# Patient Record
Sex: FEMALE | Race: WHITE | NOT HISPANIC OR LATINO | Employment: FULL TIME | URBAN - METROPOLITAN AREA
[De-identification: names, ages, dates, MRNs, and addresses within clinical notes are randomized per-mention and may not be internally consistent; named-entity substitution may affect disease eponyms.]

---

## 2023-03-11 ENCOUNTER — APPOINTMENT (OUTPATIENT)
Dept: ULTRASOUND IMAGING | Facility: CLINIC | Age: 33
End: 2023-03-11
Attending: EMERGENCY MEDICINE
Payer: COMMERCIAL

## 2023-03-11 ENCOUNTER — HOSPITAL ENCOUNTER (EMERGENCY)
Facility: CLINIC | Age: 33
Discharge: HOME | End: 2023-03-11
Attending: EMERGENCY MEDICINE | Admitting: EMERGENCY MEDICINE
Payer: COMMERCIAL

## 2023-03-11 VITALS
SYSTOLIC BLOOD PRESSURE: 119 MMHG | HEART RATE: 87 BPM | RESPIRATION RATE: 18 BRPM | WEIGHT: 158.4 LBS | TEMPERATURE: 97.4 F | OXYGEN SATURATION: 100 % | HEIGHT: 66 IN | DIASTOLIC BLOOD PRESSURE: 77 MMHG | BODY MASS INDEX: 25.46 KG/M2

## 2023-03-11 DIAGNOSIS — R10.2 PELVIC PAIN IN FEMALE: ICD-10-CM

## 2023-03-11 DIAGNOSIS — R74.8 ELEVATED LIPASE: ICD-10-CM

## 2023-03-11 LAB
ALBUMIN SERPL BCG-MCNC: 4.1 G/DL (ref 3.5–5.2)
ALBUMIN UR-MCNC: NEGATIVE MG/DL
ALP SERPL-CCNC: 75 U/L (ref 35–104)
ALT SERPL W P-5'-P-CCNC: 12 U/L (ref 10–35)
ANION GAP SERPL CALCULATED.3IONS-SCNC: 13 MMOL/L (ref 7–15)
APPEARANCE UR: CLEAR
AST SERPL W P-5'-P-CCNC: 14 U/L (ref 10–35)
BACTERIA #/AREA URNS HPF: ABNORMAL /HPF
BASOPHILS # BLD AUTO: 0 10E3/UL (ref 0–0.2)
BASOPHILS NFR BLD AUTO: 0 %
BILIRUB SERPL-MCNC: 0.2 MG/DL
BILIRUB UR QL STRIP: NEGATIVE
BUN SERPL-MCNC: 5.2 MG/DL (ref 6–20)
CALCIUM SERPL-MCNC: 9.1 MG/DL (ref 8.6–10)
CHLORIDE SERPL-SCNC: 102 MMOL/L (ref 98–107)
CLUE CELLS: NORMAL
COLOR UR AUTO: ABNORMAL
CREAT SERPL-MCNC: 0.52 MG/DL (ref 0.51–0.95)
DEPRECATED HCO3 PLAS-SCNC: 20 MMOL/L (ref 22–29)
EOSINOPHIL # BLD AUTO: 0.1 10E3/UL (ref 0–0.7)
EOSINOPHIL NFR BLD AUTO: 1 %
ERYTHROCYTE [DISTWIDTH] IN BLOOD BY AUTOMATED COUNT: 13 % (ref 10–15)
GFR SERPL CREATININE-BSD FRML MDRD: >90 ML/MIN/1.73M2
GLUCOSE SERPL-MCNC: 68 MG/DL (ref 70–99)
GLUCOSE UR STRIP-MCNC: NEGATIVE MG/DL
HCT VFR BLD AUTO: 40.3 % (ref 35–47)
HGB BLD-MCNC: 12.5 G/DL (ref 11.7–15.7)
HGB UR QL STRIP: NEGATIVE
IMM GRANULOCYTES # BLD: 0 10E3/UL
IMM GRANULOCYTES NFR BLD: 0 %
KETONES UR STRIP-MCNC: NEGATIVE MG/DL
LEUKOCYTE ESTERASE UR QL STRIP: NEGATIVE
LIPASE SERPL-CCNC: 235 U/L (ref 13–60)
LYMPHOCYTES # BLD AUTO: 1.7 10E3/UL (ref 0.8–5.3)
LYMPHOCYTES NFR BLD AUTO: 20 %
MCH RBC QN AUTO: 28.6 PG (ref 26.5–33)
MCHC RBC AUTO-ENTMCNC: 31 G/DL (ref 31.5–36.5)
MCV RBC AUTO: 92 FL (ref 78–100)
MONOCYTES # BLD AUTO: 0.5 10E3/UL (ref 0–1.3)
MONOCYTES NFR BLD AUTO: 6 %
NEUTROPHILS # BLD AUTO: 6.2 10E3/UL (ref 1.6–8.3)
NEUTROPHILS NFR BLD AUTO: 73 %
NITRATE UR QL: NEGATIVE
NRBC # BLD AUTO: 0 10E3/UL
NRBC BLD AUTO-RTO: 0 /100
PH UR STRIP: 6.5 [PH] (ref 5–7)
PLATELET # BLD AUTO: 221 10E3/UL (ref 150–450)
POTASSIUM SERPL-SCNC: 4.3 MMOL/L (ref 3.4–5.3)
PROT SERPL-MCNC: 6.7 G/DL (ref 6.4–8.3)
RBC # BLD AUTO: 4.37 10E6/UL (ref 3.8–5.2)
RBC URINE: 0 /HPF
SODIUM SERPL-SCNC: 135 MMOL/L (ref 136–145)
SP GR UR STRIP: 1 (ref 1–1.03)
SQUAMOUS EPITHELIAL: <1 /HPF
TRICHOMONAS, WET PREP: NORMAL
UROBILINOGEN UR STRIP-MCNC: NORMAL MG/DL
WBC # BLD AUTO: 8.5 10E3/UL (ref 4–11)
WBC URINE: 2 /HPF
WBC'S/HIGH POWER FIELD, WET PREP: NORMAL
YEAST, WET PREP: NORMAL

## 2023-03-11 PROCEDURE — 87491 CHLMYD TRACH DNA AMP PROBE: CPT | Performed by: EMERGENCY MEDICINE

## 2023-03-11 PROCEDURE — 87210 SMEAR WET MOUNT SALINE/INK: CPT | Performed by: EMERGENCY MEDICINE

## 2023-03-11 PROCEDURE — 76805 OB US >/= 14 WKS SNGL FETUS: CPT | Mod: 26 | Performed by: RADIOLOGY

## 2023-03-11 PROCEDURE — 36415 COLL VENOUS BLD VENIPUNCTURE: CPT | Performed by: EMERGENCY MEDICINE

## 2023-03-11 PROCEDURE — 85025 COMPLETE CBC W/AUTO DIFF WBC: CPT | Performed by: EMERGENCY MEDICINE

## 2023-03-11 PROCEDURE — 99285 EMERGENCY DEPT VISIT HI MDM: CPT | Performed by: EMERGENCY MEDICINE

## 2023-03-11 PROCEDURE — 76805 OB US >/= 14 WKS SNGL FETUS: CPT

## 2023-03-11 PROCEDURE — 80053 COMPREHEN METABOLIC PANEL: CPT | Performed by: EMERGENCY MEDICINE

## 2023-03-11 PROCEDURE — 87591 N.GONORRHOEAE DNA AMP PROB: CPT | Performed by: EMERGENCY MEDICINE

## 2023-03-11 PROCEDURE — 76705 ECHO EXAM OF ABDOMEN: CPT | Mod: 26 | Performed by: RADIOLOGY

## 2023-03-11 PROCEDURE — 81001 URINALYSIS AUTO W/SCOPE: CPT | Performed by: EMERGENCY MEDICINE

## 2023-03-11 PROCEDURE — 83690 ASSAY OF LIPASE: CPT | Performed by: EMERGENCY MEDICINE

## 2023-03-11 PROCEDURE — 76705 ECHO EXAM OF ABDOMEN: CPT

## 2023-03-11 PROCEDURE — 99285 EMERGENCY DEPT VISIT HI MDM: CPT | Mod: 25 | Performed by: EMERGENCY MEDICINE

## 2023-03-11 RX ORDER — PRENATAL VIT/IRON FUM/FOLIC AC 27MG-0.8MG
1 TABLET ORAL DAILY
COMMUNITY

## 2023-03-11 RX ORDER — HYDROCODONE BITARTRATE AND ACETAMINOPHEN 5; 325 MG/1; MG/1
1 TABLET ORAL EVERY 6 HOURS PRN
Qty: 10 TABLET | Refills: 0 | Status: SHIPPED | OUTPATIENT
Start: 2023-03-11 | End: 2023-03-14

## 2023-03-11 RX ORDER — BUTALBITAL, ACETAMINOPHEN AND CAFFEINE 50; 325; 40 MG/1; MG/1; MG/1
1 TABLET ORAL EVERY 4 HOURS PRN
Qty: 20 TABLET | Refills: 0 | Status: SHIPPED | OUTPATIENT
Start: 2023-03-11

## 2023-03-11 RX ORDER — BUTALBITAL, ACETAMINOPHEN AND CAFFEINE 50; 325; 40 MG/1; MG/1; MG/1
1 TABLET ORAL EVERY 4 HOURS PRN
COMMUNITY
End: 2023-08-05

## 2023-03-11 RX ORDER — BUPROPION HYDROCHLORIDE 150 MG/1
150 TABLET, EXTENDED RELEASE ORAL 2 TIMES DAILY
COMMUNITY
End: 2023-08-05

## 2023-03-11 RX ORDER — CITALOPRAM HYDROBROMIDE 40 MG/1
40 TABLET ORAL DAILY
COMMUNITY

## 2023-03-11 RX ORDER — METHYLPHENIDATE HYDROCHLORIDE 36 MG/1
36 TABLET ORAL EVERY MORNING
COMMUNITY

## 2023-03-11 RX ORDER — METOCLOPRAMIDE 10 MG/1
10 TABLET ORAL 2 TIMES DAILY PRN
COMMUNITY
End: 2023-08-05

## 2023-03-11 RX ORDER — ONDANSETRON 4 MG/1
4 TABLET, FILM COATED ORAL EVERY 8 HOURS PRN
Qty: 15 TABLET | Refills: 0 | Status: SHIPPED | OUTPATIENT
Start: 2023-03-11

## 2023-03-11 RX ORDER — METHYLPHENIDATE HYDROCHLORIDE 10 MG/1
10 TABLET ORAL 2 TIMES DAILY
COMMUNITY

## 2023-03-11 ASSESSMENT — ACTIVITIES OF DAILY LIVING (ADL)
ADLS_ACUITY_SCORE: 35
ADLS_ACUITY_SCORE: 33
ADLS_ACUITY_SCORE: 33

## 2023-03-11 NOTE — DISCHARGE INSTRUCTIONS
TODAY'S VISIT:  You were seen today for pelvic pain   -   - If you had any labs or imaging/radiology tests performed today, you should also discuss these tests with your usual provider.     FOLLOW-UP:  Please make an appointment to follow up with:  - Your Primary Care Provider. If you do not have a PCP, please call the Primary Care Center (phone: [phone] for an appointment  - OB/Gyn - [facility] (phone: [phone])     - Have your provider review the results from today's visit with you again to make sure no further follow-up or additional testing is needed based on those results.     RETURN TO THE EMERGENCY DEPARTMENT  Return to the Emergency Department at any time for any new or worsening symptoms or any concerns.

## 2023-03-11 NOTE — ED PROVIDER NOTES
History     Chief Complaint   Patient presents with     Pelvic Pain     HPI  Paige Wood is a 32 year old  female with a past medical history of ADHD, anxiety who presents to the emergency department with a chief complaint of pelvic pain. She reports she was diagnosed with a UTI on , treated with antibiotics. She reports having flank pain at that time, no kidney stones at that time. She has noticed darker, cloudier urine again about 1 week ago. She held off on coming in and the pain has worsened. She called her OB clinic and was advised to come in. She is currently 19 weeks pregnant. She also notes some lower back pain. Dysuria is a burning sensation, mostly on the left. Gets OB care through [facility] in [place].  No history of UTIs prior to this pregnancy.  The patient notes that the pain was severe enough last night that she was having difficulty sleeping.  She did take a Fioricet (she is prescribed this to take as needed for headaches) which did help her pelvic pain as well.    Pt presents with a week long history of bilateral low back, pelvic and lower abdominal pain.  Pt did not see OB for pain.  Called nurse line this AM and was advised to present to ED for additional evaluation.     Unable to access pt's records through care everywhere (unclear why this does not come up on search. Pt's chart is currently marked for merge, but I was also unable to access care everywhere records through pt's prior name), but on pt's mychart viewed on pt's phone  UA showed bacteria and WBCs, urine culture with no growth. US of the kidneys was normal. WBC count was normal.     I have reviewed the Medications, Allergies, Past Medical and Surgical History, and Social History in the [employer] system.    Past Medical History:   Diagnosis Date     ADHD (attention deficit hyperactivity disorder)      Anxiety      History reviewed. No pertinent surgical history.  No current facility-administered medications for this  "encounter.     Current Outpatient Medications   Medication     buPROPion (WELLBUTRIN SR) 150 MG 12 hr tablet     butalbital-acetaminophen-caffeine (ESGIC) -40 MG tablet     citalopram (CELEXA) 40 MG tablet     methylphenidate (CONCERTA) 36 MG CR tablet     methylphenidate (RITALIN) 10 MG tablet     metoclopramide (REGLAN) 10 MG tablet     Prenatal Vit-Fe Fumarate-FA (PRENATAL MULTIVITAMIN W/IRON) 27-0.8 MG tablet     Allergies   Allergen Reactions     Azithromycin Nausea and Vomiting     Metformin Nausea and Vomiting     Past medical history, past surgical history, medications, and allergies were reviewed with the patient. Additional pertinent items: None    Social History     Socioeconomic History     Marital status:      Spouse name: Not on file     Number of children: Not on file     Years of education: Not on file     Highest education level: Not on file   Occupational History     Not on file   Tobacco Use     Smoking status: Never     Smokeless tobacco: Never   Substance and Sexual Activity     Alcohol use: Not on file     Drug use: Not on file     Sexual activity: Not on file   Other Topics Concern     Not on file   Social History Narrative     Not on file     Social Determinants of Health     Financial Resource Strain: Not on file   Food Insecurity: Not on file   Transportation Needs: Not on file   Physical Activity: Not on file   Stress: Not on file   Social Connections: Not on file   Intimate Partner Violence: Not on file   Housing Stability: Not on file     Social history was reviewed with the patient. Additional pertinent items: None    Review of Systems  A medically appropriate review of systems was performed with pertinent positives and negatives noted in the HPI, and all other systems negative.    Physical Exam   BP: 119/77  Pulse: 87  Temp: 97.4  F (36.3  C)  Resp: 18  Height: 167.6 cm (5' 6\")  Weight: 71.8 kg (158 lb 6.4 oz)  SpO2: 100 %      General: Well nourished, well developed, " NAD  HEENT: EOMI, anicteric. NCAT, MMM  Neck: no jugular venous distension, supple, nl ROM  Cardiac: Regular rate and rhythm. No murmurs, rubs, or gallops. Normal S1, S2.  Intact peripheral pulses  Pulm: CTAB, no stridor, wheezes, rales, rhonchi  Abd: Soft, gravid, TTP BL pelvis area (left greater than right and L lower back, no CVA TTP, no epigastric TTP, there is some mild LUQ TTP, no rebound or guarding, nondistended.  No masses palpated.  : external exam normal, white vaginal discharge is present   Skin: Warm and dry to the touch.  No rash  Extremities: No LE edema, no cyanosis, w/w/p  Neuro: A&Ox3, no gross focal deficits    ED Course        Procedures                        Labs Ordered and Resulted from Time of ED Arrival to Time of ED Departure   COMPREHENSIVE METABOLIC PANEL - Abnormal       Result Value    Sodium 135 (*)     Potassium 4.3      Chloride 102      Carbon Dioxide (CO2) 20 (*)     Anion Gap 13      Urea Nitrogen 5.2 (*)     Creatinine 0.52      Calcium 9.1      Glucose 68 (*)     Alkaline Phosphatase 75      AST 14      ALT 12      Protein Total 6.7      Albumin 4.1      Bilirubin Total 0.2      GFR Estimate >90     LIPASE - Abnormal    Lipase 235 (*)    ROUTINE UA WITH MICROSCOPIC REFLEX TO CULTURE - Abnormal    Color Urine Light Yellow      Appearance Urine Clear      Glucose Urine Negative      Bilirubin Urine Negative      Ketones Urine Negative      Specific Gravity Urine 1.004      Blood Urine Negative      pH Urine 6.5      Protein Albumin Urine Negative      Urobilinogen Urine Normal      Nitrite Urine Negative      Leukocyte Esterase Urine Negative      Bacteria Urine Few (*)     RBC Urine 0      WBC Urine 2      Squamous Epithelials Urine <1     CBC WITH PLATELETS AND DIFFERENTIAL - Abnormal    WBC Count 8.5      RBC Count 4.37      Hemoglobin 12.5      Hematocrit 40.3      MCV 92      MCH 28.6      MCHC 31.0 (*)     RDW 13.0      Platelet Count 221      % Neutrophils 73      %  Lymphocytes 20      % Monocytes 6      % Eosinophils 1      % Basophils 0      % Immature Granulocytes 0      NRBCs per 100 WBC 0      Absolute Neutrophils 6.2      Absolute Lymphocytes 1.7      Absolute Monocytes 0.5      Absolute Eosinophils 0.1      Absolute Basophils 0.0      Absolute Immature Granulocytes 0.0      Absolute NRBCs 0.0     WET PREPARATION - Normal    Trichomonas Absent      Yeast Absent      Clue Cells Absent      WBCs/high power field None     CHLAMYDIA TRACHOMATIS PCR   NEISSERIA GONORRHOEAE PCR            Results for orders placed or performed during the hospital encounter of 03/11/23 (from the past 24 hour(s))   UA with Microscopic reflex to Culture    Specimen: Urine, Midstream   Result Value Ref Range    Color Urine Light Yellow Colorless, Straw, Light Yellow, Yellow    Appearance Urine Clear Clear    Glucose Urine Negative Negative mg/dL    Bilirubin Urine Negative Negative    Ketones Urine Negative Negative mg/dL    Specific Gravity Urine 1.004 1.003 - 1.035    Blood Urine Negative Negative    pH Urine 6.5 5.0 - 7.0    Protein Albumin Urine Negative Negative mg/dL    Urobilinogen Urine Normal Normal, 2.0 mg/dL    Nitrite Urine Negative Negative    Leukocyte Esterase Urine Negative Negative    Bacteria Urine Few (A) None Seen /HPF    RBC Urine 0 <=2 /HPF    WBC Urine 2 <=5 /HPF    Squamous Epithelials Urine <1 <=1 /HPF    Narrative    Urine Culture not indicated   CBC with platelets differential    Narrative    The following orders were created for panel order CBC with platelets differential.  Procedure                               Abnormality         Status                     ---------                               -----------         ------                     CBC with platelets and d...[[id number]]  Abnormal            Final result                 Please view results for these tests on the individual orders.   Comprehensive metabolic panel   Result Value Ref Range    Sodium 135 (L) 136  - 145 mmol/L    Potassium 4.3 3.4 - 5.3 mmol/L    Chloride 102 98 - 107 mmol/L    Carbon Dioxide (CO2) 20 (L) 22 - 29 mmol/L    Anion Gap 13 7 - 15 mmol/L    Urea Nitrogen 5.2 (L) 6.0 - 20.0 mg/dL    Creatinine 0.52 0.51 - 0.95 mg/dL    Calcium 9.1 8.6 - 10.0 mg/dL    Glucose 68 (L) 70 - 99 mg/dL    Alkaline Phosphatase 75 35 - 104 U/L    AST 14 10 - 35 U/L    ALT 12 10 - 35 U/L    Protein Total 6.7 6.4 - 8.3 g/dL    Albumin 4.1 3.5 - 5.2 g/dL    Bilirubin Total 0.2 <=1.2 mg/dL    GFR Estimate >90 >60 mL/min/1.73m2   Lipase   Result Value Ref Range    Lipase 235 (H) 13 - 60 U/L   CBC with platelets and differential   Result Value Ref Range    WBC Count 8.5 4.0 - 11.0 10e3/uL    RBC Count 4.37 3.80 - 5.20 10e6/uL    Hemoglobin 12.5 11.7 - 15.7 g/dL    Hematocrit 40.3 35.0 - 47.0 %    MCV 92 78 - 100 fL    MCH 28.6 26.5 - 33.0 pg    MCHC 31.0 (L) 31.5 - 36.5 g/dL    RDW 13.0 10.0 - 15.0 %    Platelet Count 221 150 - 450 10e3/uL    % Neutrophils 73 %    % Lymphocytes 20 %    % Monocytes 6 %    % Eosinophils 1 %    % Basophils 0 %    % Immature Granulocytes 0 %    NRBCs per 100 WBC 0 <1 /100    Absolute Neutrophils 6.2 1.6 - 8.3 10e3/uL    Absolute Lymphocytes 1.7 0.8 - 5.3 10e3/uL    Absolute Monocytes 0.5 0.0 - 1.3 10e3/uL    Absolute Eosinophils 0.1 0.0 - 0.7 10e3/uL    Absolute Basophils 0.0 0.0 - 0.2 10e3/uL    Absolute Immature Granulocytes 0.0 <=0.4 10e3/uL    Absolute NRBCs 0.0 10e3/uL   US OB > 14 Weeks    Narrative    Obstetrical ultrasound greater than 14 weeks    INDICATION: Pelvic pain    COMPARISON: None    FINDINGS: Still images acquired. An intrauterine pregnancy is  identified. Biparietal diameter is 4.10 cm. Head circumference is 15.6  cm. These correspond with an 18 week 4 day gestation. Femoral length  is measured at 2.8 cm corresponding with an 18 week 3 day gestation.  Fetal cardiac activity is documented at 153 bpm. The fetal lie is  currently cephalic. There is no evidence of retroplacental  hemorrhage  the placenta is anterior. No suspicious fluid collections.      Impression    IMPRESSION: Single, live intrauterine pregnancy measuring  approximately 18 weeks 4 days gestational age by ultrasound  measurements.    ANDRZEJ HANDY MD         SYSTEM ID:  [id number]   Wet prep    Specimen: Vagina; Swab   Result Value Ref Range    Trichomonas Absent Absent    Yeast Absent Absent    Clue Cells Absent Absent    WBCs/high power field None None   Abdomen US, limited (RUQ only)    Narrative    EXAMINATION: US ABDOMEN LIMITED, 3/11/2023 8:29 PM    COMPARISON: None.    HISTORY: elevated lipase, abd pain, elevated lipase, abd pain    TECHNIQUE: The abdomen was scanned in standard fashion with  specialized ultrasound transducer(s) using both grey scale and limited  color Doppler techniques.    FINDINGS:   Fluid: No evidence of ascites or pleural effusions.    Liver: The liver demonstrates normal echotexture, measuring 14.1 cm in  craniocaudal dimension. There is no focal mass. The main portal vein  is patent with antegrade flow towards the liver.    Gallbladder: There is no wall thickening, pericholecystic fluid,  positive sonographic Greenberg's sign or evidence for cholelithiasis.    Bile Ducts: Both the intra- and extrahepatic biliary system are of  normal caliber.  The common bile duct measures 2 mm in diameter.    Pancreas: Visualized portions of the head and body of the pancreas are  unremarkable.     Kidney: The right kidney measures 11.2 cm long. There is no  hydronephrosis or hydroureter, no shadowing renal calculi, cystic  lesion or mass.       Impression    IMPRESSION: Normal right upper quadrant ultrasound.    I have personally reviewed the examination and initial interpretation  and I agree with the findings.    ANDRZEJ HANDY MD         SYSTEM ID:  [id number]       Labs, vital signs, and imaging studies were reviewed by me.    Medications - No data to display    Assessments & Plan (with Medical  Decision Making)   Paige Wood is a 32 year old female who presents to the emergency department with pelvic pain and dysuria.  Differential diagnosis includes UTI, renal stone, pregnancy related pain/round ligament pain, ovarian cyst/torsion, constipation, gastritis/gastroenteritis, peptic ulcer disease, GERD, pancreatitis.  Labs, urinalysis ordered to further evaluate the patient.    Urinalysis is not consistent with UTI.  Will obtain pelvic ultrasound to evaluate for other pathology.    Laboratory work-up is remarkable for normal white blood cell count, creatinine is not elevated, lipase is significantly elevated at 235 (this is greater than 3 times the upper limit of normal which for our lab is 60).  Unclear if patient has having symptoms consistent with pancreatitis, she has had some nausea and does have some mild left upper quadrant tenderness on exam, however, patient's pain is primarily located in the pelvic area, primarily the left pelvis. CT of the abdomen would potentially allow for further evaluate for pancreatic pathology, but was deferred due to current pregnancy.  Will discuss with OB/GYN and GI.    Patient was discussed with OB/GYN, ultrasound and labs are overall reassuring.  No clear obstetric reason for lipase elevation.  They would recommend pelvic exam with wet prep/GC/chlamydia testing.    Wet prep is negative    Patient was discussed with gastroenterology, they would recommend right upper quadrant ultrasound to evaluate for biliary obstruction or other underlying pathology associated with pancreatic enzyme elevation.  If this is negative, patient could be discharged home with outpatient follow-up if able to tolerate p.o. and symptoms well controlled.    Right upper quadrant ultrasound is negative.    Considered admission for IV fluids and pain control, but patient's symptoms currently manageable and patient was p.o. challenged in the emergency department and was able to tolerate p.o. without  difficulty, so plan to discharge patient with close outpatient follow up with her PCP or OBGYN with low threshold for patient to return to the ER if she is unable to tolerate PO or if her pain is not well managed. Pt is in agreement with this plan.     Critical care was not performed.     Medical Decision Making  The patient's presentation was of moderate complexity (an undiagnosed new problem with uncertain diagnosis).    The patient's evaluation involved:  ordering and/or review of 3+ test(s) in this encounter (see separate area of note for details)  strong consideration of a test (see separate area of note for details) that was ultimately deferred  independent interpretation of testing performed by another health professional ([place])  discussion of management or test interpretation with another health professional (see separate area of note for details)    The patient's management necessitated moderate risk (prescription drug management including medications given in the ED) and high risk (a decision regarding hospitalization)    I have reviewed the nursing notes.    I have reviewed the findings, diagnosis, plan and need for follow up with the patient.    Patient to be discharged home. Advised to follow up with PCP within 1 week. To return to ER immediately with any new/worsening symptoms. Plan of care discussed with patient who expresses understanding and agrees with plan of care.    Patient to be provided with prescriptions for symptomatic relief at home, including Zofran and Norco (patient instructed to use this only if pain not controlled with other medications).  Additionally, the patient states she takes Fioricet as needed for her headaches and is running out of this medication, she does request a new prescription for this which was provided.  Risks and benefits of the usage of these medications, particularly as regards patient's current pregnancy (particularly possible increased risk of birth defects - which  would have been greatest in early pregnancy) and risk of opiate dependence were discussed with the patient.    Discharge Medication List as of 3/11/2023  9:19 PM      START taking these medications    Details   !! butalbital-acetaminophen-caffeine (ESGIC) -40 MG tablet Take 1 tablet by mouth every 4 hours as needed for headaches, Disp-20 tablet, R-0, Local Print      ondansetron (ZOFRAN) 4 MG tablet Take 1 tablet (4 mg) by mouth every 8 hours as needed, Disp-15 tablet, R-0, Local Print       !! - Potential duplicate medications found. Please discuss with provider.          Final diagnoses:   Pelvic pain in female   Elevated lipase       Petty Marquez MD  3/11/2023   [facility] EMERGENCY DEPARTMENT     Petty Marquez MD  03/11/23 [id number]

## 2023-03-11 NOTE — ED TRIAGE NOTES
Pt presents with a week long history of bilateral low back, pelvic and lower abdominal pain.  Pt did not see OB for pain.  Called nurse line this AM and was advised to present to ED for additional evaluation.      Triage Assessment     Row Name 03/11/23 4553       Triage Assessment (Adult)    Airway WDL WDL       Respiratory WDL    Respiratory WDL WDL       Skin Circulation/Temperature WDL    Skin Circulation/Temperature WDL WDL       Cardiac WDL    Cardiac WDL WDL       Peripheral/Neurovascular WDL    Peripheral Neurovascular WDL WDL       Cognitive/Neuro/Behavioral WDL    Cognitive/Neuro/Behavioral WDL WDL       Hessmer Coma Scale    Best Eye Response 4-->(E4) spontaneous    Best Motor Response 6-->(M6) obeys commands    Best Verbal Response 5-->(V5) oriented    [place] Coma Scale Score 15

## 2023-03-12 LAB
C TRACH DNA SPEC QL NAA+PROBE: NEGATIVE
N GONORRHOEA DNA SPEC QL NAA+PROBE: NEGATIVE

## 2023-08-05 ENCOUNTER — APPOINTMENT (OUTPATIENT)
Dept: ULTRASOUND IMAGING | Facility: CLINIC | Age: 33
End: 2023-08-05
Attending: EMERGENCY MEDICINE
Payer: COMMERCIAL

## 2023-08-05 ENCOUNTER — APPOINTMENT (OUTPATIENT)
Dept: CT IMAGING | Facility: CLINIC | Age: 33
End: 2023-08-05
Attending: EMERGENCY MEDICINE
Payer: COMMERCIAL

## 2023-08-05 ENCOUNTER — HOSPITAL ENCOUNTER (EMERGENCY)
Facility: CLINIC | Age: 33
Discharge: OTHER INSTITUTION | End: 2023-08-06
Attending: EMERGENCY MEDICINE | Admitting: EMERGENCY MEDICINE
Payer: COMMERCIAL

## 2023-08-05 ENCOUNTER — APPOINTMENT (OUTPATIENT)
Dept: RADIOLOGY | Facility: CLINIC | Age: 33
End: 2023-08-05
Attending: EMERGENCY MEDICINE
Payer: COMMERCIAL

## 2023-08-05 DIAGNOSIS — N71.9 ENDOMETRITIS: ICD-10-CM

## 2023-08-05 LAB
ALBUMIN MFR UR ELPH: 7.1 MG/DL (ref 1–14)
ALBUMIN SERPL-MCNC: 2.7 G/DL (ref 3.5–5)
ALBUMIN UR-MCNC: NEGATIVE MG/DL
ALP SERPL-CCNC: 114 U/L (ref 45–120)
ALT SERPL W P-5'-P-CCNC: 28 U/L (ref 0–45)
ANION GAP SERPL CALCULATED.3IONS-SCNC: 13 MMOL/L (ref 5–18)
APPEARANCE UR: CLEAR
AST SERPL W P-5'-P-CCNC: 18 U/L (ref 0–40)
BILIRUB DIRECT SERPL-MCNC: 0.2 MG/DL
BILIRUB SERPL-MCNC: 0.5 MG/DL (ref 0–1)
BILIRUB UR QL STRIP: NEGATIVE
BUN SERPL-MCNC: 12 MG/DL (ref 8–22)
CALCIUM SERPL-MCNC: 8.7 MG/DL (ref 8.5–10.5)
CHLORIDE BLD-SCNC: 105 MMOL/L (ref 98–107)
CO2 SERPL-SCNC: 18 MMOL/L (ref 22–31)
COLOR UR AUTO: ABNORMAL
CREAT SERPL-MCNC: 0.71 MG/DL (ref 0.6–1.1)
CREAT UR-MCNC: 120 MG/DL
ERYTHROCYTE [DISTWIDTH] IN BLOOD BY AUTOMATED COUNT: 14.8 % (ref 10–15)
FLUAV RNA SPEC QL NAA+PROBE: NEGATIVE
FLUBV RNA RESP QL NAA+PROBE: NEGATIVE
GFR SERPL CREATININE-BSD FRML MDRD: >90 ML/MIN/1.73M2
GLUCOSE BLD-MCNC: 107 MG/DL (ref 70–125)
GLUCOSE UR STRIP-MCNC: NEGATIVE MG/DL
HCT VFR BLD AUTO: 32.5 % (ref 35–47)
HGB BLD-MCNC: 10.6 G/DL (ref 11.7–15.7)
HGB UR QL STRIP: ABNORMAL
KETONES UR STRIP-MCNC: NEGATIVE MG/DL
LACTATE SERPL-SCNC: 0.8 MMOL/L (ref 0.7–2)
LACTATE SERPL-SCNC: 2.4 MMOL/L (ref 0.7–2)
LEUKOCYTE ESTERASE UR QL STRIP: ABNORMAL
MCH RBC QN AUTO: 27.3 PG (ref 26.5–33)
MCHC RBC AUTO-ENTMCNC: 32.6 G/DL (ref 31.5–36.5)
MCV RBC AUTO: 84 FL (ref 78–100)
MUCOUS THREADS #/AREA URNS LPF: PRESENT /LPF
NITRATE UR QL: NEGATIVE
PH UR STRIP: 5.5 [PH] (ref 5–7)
PLATELET # BLD AUTO: 304 10E3/UL (ref 150–450)
POTASSIUM BLD-SCNC: 3.5 MMOL/L (ref 3.5–5)
PROT SERPL-MCNC: 6.2 G/DL (ref 6–8)
PROT/CREAT 24H UR: 0.06 MG/MG CR
RBC # BLD AUTO: 3.88 10E6/UL (ref 3.8–5.2)
RBC URINE: 2 /HPF
RSV RNA SPEC NAA+PROBE: NEGATIVE
SARS-COV-2 RNA RESP QL NAA+PROBE: NEGATIVE
SODIUM SERPL-SCNC: 136 MMOL/L (ref 136–145)
SP GR UR STRIP: 1.02 (ref 1–1.03)
SQUAMOUS EPITHELIAL: <1 /HPF
UROBILINOGEN UR STRIP-MCNC: <2 MG/DL
WBC # BLD AUTO: 12.4 10E3/UL (ref 4–11)
WBC URINE: 2 /HPF

## 2023-08-05 PROCEDURE — 250N000011 HC RX IP 250 OP 636: Performed by: EMERGENCY MEDICINE

## 2023-08-05 PROCEDURE — 99285 EMERGENCY DEPT VISIT HI MDM: CPT | Mod: 25

## 2023-08-05 PROCEDURE — 96361 HYDRATE IV INFUSION ADD-ON: CPT

## 2023-08-05 PROCEDURE — 250N000013 HC RX MED GY IP 250 OP 250 PS 637: Performed by: EMERGENCY MEDICINE

## 2023-08-05 PROCEDURE — 71046 X-RAY EXAM CHEST 2 VIEWS: CPT

## 2023-08-05 PROCEDURE — 84156 ASSAY OF PROTEIN URINE: CPT | Performed by: EMERGENCY MEDICINE

## 2023-08-05 PROCEDURE — 96366 THER/PROPH/DIAG IV INF ADDON: CPT

## 2023-08-05 PROCEDURE — 83605 ASSAY OF LACTIC ACID: CPT | Performed by: EMERGENCY MEDICINE

## 2023-08-05 PROCEDURE — 36415 COLL VENOUS BLD VENIPUNCTURE: CPT | Performed by: EMERGENCY MEDICINE

## 2023-08-05 PROCEDURE — 96375 TX/PRO/DX INJ NEW DRUG ADDON: CPT

## 2023-08-05 PROCEDURE — 76856 US EXAM PELVIC COMPLETE: CPT

## 2023-08-05 PROCEDURE — 250N000011 HC RX IP 250 OP 636: Mod: JZ | Performed by: EMERGENCY MEDICINE

## 2023-08-05 PROCEDURE — 96365 THER/PROPH/DIAG IV INF INIT: CPT | Mod: 59

## 2023-08-05 PROCEDURE — 87040 BLOOD CULTURE FOR BACTERIA: CPT | Performed by: EMERGENCY MEDICINE

## 2023-08-05 PROCEDURE — 87637 SARSCOV2&INF A&B&RSV AMP PRB: CPT | Performed by: EMERGENCY MEDICINE

## 2023-08-05 PROCEDURE — 85027 COMPLETE CBC AUTOMATED: CPT | Performed by: EMERGENCY MEDICINE

## 2023-08-05 PROCEDURE — 258N000003 HC RX IP 258 OP 636: Performed by: EMERGENCY MEDICINE

## 2023-08-05 PROCEDURE — 74177 CT ABD & PELVIS W/CONTRAST: CPT

## 2023-08-05 PROCEDURE — 96367 TX/PROPH/DG ADDL SEQ IV INF: CPT

## 2023-08-05 PROCEDURE — 82248 BILIRUBIN DIRECT: CPT | Performed by: EMERGENCY MEDICINE

## 2023-08-05 PROCEDURE — 81001 URINALYSIS AUTO W/SCOPE: CPT | Performed by: EMERGENCY MEDICINE

## 2023-08-05 RX ORDER — MORPHINE SULFATE 2 MG/ML
2 INJECTION, SOLUTION INTRAMUSCULAR; INTRAVENOUS ONCE
Status: COMPLETED | OUTPATIENT
Start: 2023-08-05 | End: 2023-08-05

## 2023-08-05 RX ORDER — IOPAMIDOL 755 MG/ML
80 INJECTION, SOLUTION INTRAVASCULAR ONCE
Status: COMPLETED | OUTPATIENT
Start: 2023-08-05 | End: 2023-08-05

## 2023-08-05 RX ORDER — ACETAMINOPHEN 325 MG/1
975 TABLET ORAL ONCE
Status: COMPLETED | OUTPATIENT
Start: 2023-08-05 | End: 2023-08-05

## 2023-08-05 RX ORDER — ALBUTEROL SULFATE 90 UG/1
2 AEROSOL, METERED RESPIRATORY (INHALATION) EVERY 6 HOURS PRN
COMMUNITY

## 2023-08-05 RX ORDER — KETOROLAC TROMETHAMINE 15 MG/ML
15 INJECTION, SOLUTION INTRAMUSCULAR; INTRAVENOUS ONCE
Status: COMPLETED | OUTPATIENT
Start: 2023-08-05 | End: 2023-08-05

## 2023-08-05 RX ORDER — ONDANSETRON 2 MG/ML
4 INJECTION INTRAMUSCULAR; INTRAVENOUS ONCE
Status: COMPLETED | OUTPATIENT
Start: 2023-08-05 | End: 2023-08-05

## 2023-08-05 RX ORDER — OXYCODONE HYDROCHLORIDE 5 MG/1
5 TABLET ORAL EVERY 6 HOURS PRN
COMMUNITY

## 2023-08-05 RX ORDER — FERROUS SULFATE 325(65) MG
325 TABLET ORAL
COMMUNITY

## 2023-08-05 RX ORDER — ACETAMINOPHEN 500 MG
500-1000 TABLET ORAL EVERY 6 HOURS PRN
COMMUNITY

## 2023-08-05 RX ORDER — IBUPROFEN 200 MG
600-800 TABLET ORAL EVERY 6 HOURS PRN
COMMUNITY

## 2023-08-05 RX ORDER — PIPERACILLIN SODIUM, TAZOBACTAM SODIUM 3; .375 G/15ML; G/15ML
3.38 INJECTION, POWDER, LYOPHILIZED, FOR SOLUTION INTRAVENOUS ONCE
Status: DISCONTINUED | OUTPATIENT
Start: 2023-08-05 | End: 2023-08-05

## 2023-08-05 RX ORDER — BUPROPION HYDROCHLORIDE 150 MG/1
150 TABLET ORAL EVERY MORNING
COMMUNITY

## 2023-08-05 RX ORDER — CLINDAMYCIN PHOSPHATE 600 MG/50ML
600 INJECTION, SOLUTION INTRAVENOUS ONCE
Status: COMPLETED | OUTPATIENT
Start: 2023-08-05 | End: 2023-08-05

## 2023-08-05 RX ADMIN — CLINDAMYCIN PHOSPHATE 600 MG: 600 INJECTION, SOLUTION INTRAVENOUS at 19:05

## 2023-08-05 RX ADMIN — KETOROLAC TROMETHAMINE 15 MG: 15 INJECTION, SOLUTION INTRAMUSCULAR; INTRAVENOUS at 18:34

## 2023-08-05 RX ADMIN — SODIUM CHLORIDE 1000 ML: 9 INJECTION, SOLUTION INTRAVENOUS at 18:34

## 2023-08-05 RX ADMIN — ONDANSETRON 4 MG: 2 INJECTION INTRAMUSCULAR; INTRAVENOUS at 20:58

## 2023-08-05 RX ADMIN — GENTAMICIN SULFATE 360 MG: 40 INJECTION, SOLUTION INTRAMUSCULAR; INTRAVENOUS at 20:24

## 2023-08-05 RX ADMIN — IOPAMIDOL 76 ML: 755 INJECTION, SOLUTION INTRAVENOUS at 21:11

## 2023-08-05 RX ADMIN — ACETAMINOPHEN 975 MG: 325 TABLET ORAL at 23:16

## 2023-08-05 RX ADMIN — MORPHINE SULFATE 2 MG: 2 INJECTION, SOLUTION INTRAMUSCULAR; INTRAVENOUS at 20:58

## 2023-08-05 ASSESSMENT — ACTIVITIES OF DAILY LIVING (ADL)
ADLS_ACUITY_SCORE: 35

## 2023-08-05 NOTE — ED TRIAGE NOTES
Pt noted cough since yesterday. Today, had onset of fever and chills. Took tylenol a little after 1600. Max temp at home 104. Pt is also 1 week postpartum.     Triage Assessment       Row Name 08/05/23 3039       Triage Assessment (Adult)    Airway WDL WDL       Respiratory WDL    Respiratory WDL X;cough    Cough Frequency infrequent       Skin Circulation/Temperature WDL    Skin Circulation/Temperature WDL WDL       Cardiac WDL    Cardiac WDL X;rhythm    Pulse Rate & Regularity tachycardic       Peripheral/Neurovascular WDL    Peripheral Neurovascular WDL WDL       Cognitive/Neuro/Behavioral WDL    Cognitive/Neuro/Behavioral WDL WDL

## 2023-08-05 NOTE — ED PROVIDER NOTES
EMERGENCY DEPARTMENT ENCOUNTER      NAME: Paige Wood  AGE: 32 year old female  YOB: 1990  MRN: [id number]  EVALUATION DATE & TIME: 8/5/2023  5:44 PM    PCP: Suze Montes    ED PROVIDER: Octavia Torres PA-C      Chief Complaint   Patient presents with    Fever    Cough         FINAL IMPRESSION:  1. Endometritis      MEDICAL DECISION MAKING:    Pertinent Labs & Imaging studies reviewed. (See chart for details)  32 year old female presents to the Emergency Department for evaluation of multiple symptoms including cough, fever, breast pain, abdominal pain, abnormal vaginal discharge. The patient had an uncomplicated vaginal delivery on 7/29/23 and since then has been doing well other than difficulty with breast feeding due to pain. A few days ago she developed a cough which she thinks came from her daughter who has been struggling with a cold. After breast feeding her child today she had onset of chills, body aches and significant breast pain. She took a nap and woke up with significant fever of 103 and was concerned and presented to the ED. On my evaluation, the patient was tachycardic to the 120's but otherwise vitally stable. Examination with abdomen that is soft with tenderness to palpation in the suprapubic region. Heart in tachycardic rate, but regular rhythm. Lungs clear to auscultation bilaterally. Patchy area of erythema to the left breast to the superior and medial aspect. Cracked bloody nipples. Breasts warm to touch bilaterally. Differential diagnosis included UTI, endometritis, mastitis, other intraabdominal infection, COVID, influenza, pneumonia, meningitis, preeclampsia.     COVID, influenza and RSV testing negative. CBC with elevated WBC 12.4 and hemoglobin 10.6. lactate elevated at 2.4. At this time, 1000 mL of normal saline ordered and prophylactic antibiotics to cover for endometritis ordered including gentamicin and clindamycin. Blood cultures sent and are pending. BMP  without significant derangement. LFTs without significant derangement. UA without signs of infection. Urine protein negative. Patient's headache improved here with medications and no other signs or symptoms of preeclampsia with negative work-up and I do not feel this is likely cause of her symptoms.  Additionally, she does not have any meningismus on exam and with headache improved with medications given here do not feel meningitis is a likely cause of her symptoms.  To assess for possible endometritis I did order ultrasound of the pelvis.  This showed a hypoechoic area identified at the left fundal portion of the endometrium of uncertain significance however, they are concerned for possible blood clot.  No signs of endometritis on ultrasound.  Patient did spike a fever while here in the emergency department of 101.9 and she was given Toradol for this.  After this, temperature normalized to 98.8.  Patient's abdominal pain began to worsen here in the emergency department and because of this and her fevers without any other cause of likely infection I do feel endometritis is still a likely diagnosis based on clinical presentation. The patient delivered at [facility] through [facility] and they are on-call provider Dr. Lincoln who agreed that he has concerns for endometritis and would recommend admission.  At this time, he does not feel that she requires transfer to their system unless she indicates this.  I gave the patient gentamicin and clindamycin and he agrees with this treatment at this time.  When speaking to them CT of the abdomen pelvis was pending.  This did not show any acute intra-abdominal process other than a distended uterus likely due to recent delivery.  Patient's pain improved here, vitals remained stable and lactate normalized to 0.8.  After speaking with Dr. Lincoln he did feel she required admission and patient would like to be transferred to [facility] which I feel is reasonable.  I spoke  again with Dr. Lincoln who excepted the patient for admission.  Patient remained stable here in our emergency department until transfer via EMS to [facility].    Medical Decision Making    History:  Supplemental history from: Documented in chart, if applicable  External Record(s) reviewed: Documented in chart, if applicable.    Work Up:  Chart documentation includes differential considered and any EKGs or imaging independently interpreted by provider, where specified.  In additional to work up documented, I considered the following work up: Documented in chart, if applicable.    External consultation:  Discussion of management with another provider: OB-Gyn    Complicating factors:  Care impacted by chronic illness: Mental Health  Care affected by social determinants of health: N/A    Disposition considerations: Transfer to [facility] for further management cares.         ED COURSE:  5:51 PM I met with the patient, obtained history, performed an initial exam, and discussed options and plan for diagnostics and treatment here in the ED.  6:07 PM I staffed the patient with Lennie Forrest MD, and discussed patient plan of care.  8:37 PM I rechecked and updated the patient regarding plan of care.  9:21 PM I spoke with Dr. Lincoln, Obstetrics, regarding patient plan of care.  He agreed that this is likely endometritis and recommended admission to the hospital.  10:00 PM I updated patient on results and plan of care for admission.  She would like to be transferred to [facility] where she can be taken care of by her OB/GYN providers.  10:36 PM I spoke with Dr. Lincoln again who excepted the patient for admission and patient will be transferred to [facility] for further management cares.    At the conclusion of the encounter I discussed the results of all of the tests and the disposition. The questions were answered. The patient or family acknowledged understanding and was agreeable with the care plan.      MEDICATIONS GIVEN IN THE EMERGENCY:  Medications   gentamicin (GARAMYCIN) 360 mg in sodium chloride 0.9 % 50 mL intermittent infusion (0 mg Intravenous Stopped 8/5/23 2150)   0.9% sodium chloride BOLUS (0 mLs Intravenous Stopped 8/5/23 2103)   ketorolac (TORADOL) injection 15 mg (15 mg Intravenous $Given 8/5/23 1834)   clindamycin (CLEOCIN) 600 mg in 50 mL D5W intermittent infusion (0 mg Intravenous Stopped 8/5/23 2103)   morphine (PF) injection 2 mg (2 mg Intravenous $Given 8/5/23 2058)   ondansetron (ZOFRAN) injection 4 mg (4 mg Intravenous $Given 8/5/23 2058)   iopamidol (ISOVUE-370) solution 80 mL (76 mLs Intravenous $Given 8/5/23 2111)   acetaminophen (TYLENOL) tablet 975 mg (975 mg Oral $Given 8/5/23 2316)       NEW PRESCRIPTIONS STARTED AT TODAY'S ER VISIT  New Prescriptions    No medications on file            =================================================================    HPI:    Patient information was obtained from: patient    Use of Interpretor: N/A       Paige Wood is a 32 year old female with a pertinent history of polycystic ovarian syndrome and generalized anxiety disorder who presents to this ED via walk-in for evaluation of multiple complaints including cough, fever, abdominal pain, breast pain and abnormal vaginal discharge.  The patient had a uncomplicated vaginal delivery on 7/29/2023 and since then has been feeling well other than some increased difficulty breast-feeding due to pain.  Her daughter has had a viral illness leading to ear infections and patient has started to have a cough over the last few days.  Today after breast-feeding she had onset of significant chills, body aches, and breast pain.  She went to take a nap as she did not feel well and woke up with significant fever of 103  F.  She was concerned about her symptoms and presented to the emergency department.  On my evaluation, the patient reports taking Tylenol prior to arrival and this did help with her fever  however, she does still feel body aches all over.  She still feels pain in her breasts but has been able to pump without significant increase in pain.  She is not having any significant redness of her breast that she has noticed or any nipple discharge, but does feel that her breasts are quite warm to the touch.  She denies any chest pain or difficulty breathing.  She does report some lower abdominal pain, but this has been mild.  She has been having some vaginal bleeding since the delivery and does note some abnormal vaginal discharge today.  She denies any nausea or vomiting.  She denies any urinary symptoms.  She reports that the delivery was uncomplicated.  She did not need any blood transfusions.  She does not voice any other complaints at this time.      REVIEW OF SYSTEMS:  Negative unless otherwise stated in the above HPI.      PAST MEDICAL HISTORY:  Past Medical History:   Diagnosis Date    ADHD (attention deficit hyperactivity disorder)     Anxiety        PAST SURGICAL HISTORY:  No past surgical history on file.        CURRENT MEDICATIONS:      Current Facility-Administered Medications:     gentamicin (GARAMYCIN) 360 mg in sodium chloride 0.9 % 50 mL intermittent infusion, 5 mg/kg, Intravenous, Q24H, Lennie Forrest MD, Stopped at 08/05/23 2150    Current Outpatient Medications:     acetaminophen (TYLENOL) 500 MG tablet, Take 500-1,000 mg by mouth every 6 hours as needed for mild pain, Disp: , Rfl:     albuterol (PROAIR HFA/PROVENTIL HFA/VENTOLIN HFA) 108 (90 Base) MCG/ACT inhaler, Inhale 2 puffs into the lungs every 6 hours as needed for shortness of breath, wheezing or cough, Disp: , Rfl:     buPROPion (WELLBUTRIN XL) 150 MG 24 hr tablet, Take 150 mg by mouth every morning, Disp: , Rfl:     butalbital-acetaminophen-caffeine (ESGIC) -40 MG tablet, Take 1 tablet by mouth every 4 hours as needed for headaches, Disp: 20 tablet, Rfl: 0    citalopram (CELEXA) 40 MG tablet, Take 40 mg by mouth  daily, Disp: , Rfl:     ferrous sulfate (FEROSUL) 325 (65 Fe) MG tablet, Take 325 mg by mouth daily (with breakfast), Disp: , Rfl:     ibuprofen (ADVIL/MOTRIN) 200 MG tablet, Take 600-800 mg by mouth every 6 hours as needed for pain, Disp: , Rfl:     methylphenidate (CONCERTA) 36 MG CR tablet, Take 36 mg by mouth every morning, Disp: , Rfl:     methylphenidate (RITALIN) 10 MG tablet, Take 10 mg by mouth 2 times daily, Disp: , Rfl:     ondansetron (ZOFRAN) 4 MG tablet, Take 1 tablet (4 mg) by mouth every 8 hours as needed, Disp: 15 tablet, Rfl: 0    oxyCODONE (ROXICODONE) 5 MG tablet, Take 5 mg by mouth every 6 hours as needed for severe pain, Disp: , Rfl:     Prenatal Vit-Fe Fumarate-FA (PRENATAL MULTIVITAMIN W/IRON) 27-0.8 MG tablet, Take 1 tablet by mouth daily, Disp: , Rfl:       ALLERGIES:  Allergies   Allergen Reactions    Azithromycin Nausea and Vomiting    Metformin Nausea and Vomiting       FAMILY HISTORY:  No family history on file.    SOCIAL HISTORY:   Social History     Socioeconomic History    Marital status:    Tobacco Use    Smoking status: Never    Smokeless tobacco: Never       VITALS:  Patient Vitals for the past 24 hrs:   BP Temp Temp src Pulse Resp SpO2 Height Weight   08/05/23 2254 95/57 -- -- 83 -- 99 % -- --   08/05/23 2215 93/57 -- -- 74 -- 98 % -- --   08/05/23 2200 96/58 -- -- 76 -- 98 % -- --   08/05/23 2145 106/64 -- -- 77 -- 98 % -- --   08/05/23 2130 103/62 -- -- 78 -- 97 % -- --   08/05/23 2121 105/63 -- -- 81 -- 98 % -- --   08/05/23 2103 -- 98.8  F (37.1  C) Oral -- -- -- -- --   08/05/23 2045 105/58 -- -- 87 -- 97 % -- --   08/05/23 2030 111/58 -- -- 90 -- 98 % -- --   08/05/23 2015 107/58 -- -- 88 -- 96 % -- --   08/05/23 2000 103/58 -- -- 84 -- 97 % -- --   08/05/23 1945 106/61 -- -- 89 -- 97 % -- --   08/05/23 1934 109/64 -- -- 91 -- 98 % -- --   08/05/23 1915 102/56 -- -- 98 -- 97 % -- --   08/05/23 1908 107/57 -- -- 97 -- 97 % -- --   08/05/23 1800 117/62 (!) 101.9  F  "(38.8  C) Oral 113 18 95 % -- --   08/05/23 1740 106/58 98.9  F (37.2  C) Oral (!) 124 18 97 % 1.676 m (5' 6\") 70.3 kg (155 lb)       PHYSICAL EXAM    Constitutional: Well developed, Well nourished, NAD  HENT: Normocephalic, Atraumatic, Bilateral external ears normal, Oropharynx normal, mucous membranes moist, Nose normal.   Neck: Normal range of motion, No tenderness, Supple, No stridor.  Eyes: PERRL, EOMI, Conjunctiva normal, No discharge.   Respiratory: Normal breath sounds, No respiratory distress, No wheezing, Speaks full sentences easily. No cough.  Cardiovascular: Tachycardic heart rate, Regular rhythm, No murmurs, No rubs, No gallops. Chest wall nontender.  GI: Soft, suprapubic tenderness to palpation, No masses, No flank tenderness. No rebound or guarding.  Musculoskeletal: Good range of motion in all major joints.   Breast: Left breast with mild patchy erythema to the superior and lateral aspect.  Nipples cracked bilaterally with bleeding.  Breasts significantly warm to the touch.  Integument: Warm, Dry, No erythema, No rash. No petechiae.  Neurologic: Alert & oriented x 3, Normal motor function, Normal sensory function, No focal deficits noted. Normal gait.  Psychiatric: Affect normal, Judgment normal, Mood normal. Cooperative.    LAB:  All pertinent labs reviewed and interpreted.  Recent Results (from the past 24 hour(s))   Symptomatic Influenza A/B, RSV, & SARS-CoV2 PCR (COVID-19) Nasopharyngeal    Collection Time: 08/05/23  6:01 PM    Specimen: Nasopharyngeal; Swab   Result Value Ref Range    Influenza A PCR Negative Negative    Influenza B PCR Negative Negative    RSV PCR Negative Negative    SARS CoV2 PCR Negative Negative   CBC (+ platelets, no diff)    Collection Time: 08/05/23  6:17 PM   Result Value Ref Range    WBC Count 12.4 (H) 4.0 - 11.0 10e3/uL    RBC Count 3.88 3.80 - 5.20 10e6/uL    Hemoglobin 10.6 (L) 11.7 - 15.7 g/dL    Hematocrit 32.5 (L) 35.0 - 47.0 %    MCV 84 78 - 100 fL    MCH 27.3 " 26.5 - 33.0 pg    MCHC 32.6 31.5 - 36.5 g/dL    RDW 14.8 10.0 - 15.0 %    Platelet Count 304 150 - 450 10e3/uL   Basic metabolic panel    Collection Time: 08/05/23  6:17 PM   Result Value Ref Range    Sodium 136 136 - 145 mmol/L    Potassium 3.5 3.5 - 5.0 mmol/L    Chloride 105 98 - 107 mmol/L    Carbon Dioxide (CO2) 18 (L) 22 - 31 mmol/L    Anion Gap 13 5 - 18 mmol/L    Urea Nitrogen 12 8 - 22 mg/dL    Creatinine 0.71 0.60 - 1.10 mg/dL    Calcium 8.7 8.5 - 10.5 mg/dL    Glucose 107 70 - 125 mg/dL    GFR Estimate >90 >60 mL/min/1.73m2   Hepatic function panel    Collection Time: 08/05/23  6:17 PM   Result Value Ref Range    Bilirubin Total 0.5 0.0 - 1.0 mg/dL    Bilirubin Direct 0.2 <=0.5 mg/dL    Protein Total 6.2 6.0 - 8.0 g/dL    Albumin 2.7 (L) 3.5 - 5.0 g/dL    Alkaline Phosphatase 114 45 - 120 U/L    AST 18 0 - 40 U/L    ALT 28 0 - 45 U/L   Lactic acid whole blood    Collection Time: 08/05/23  6:17 PM   Result Value Ref Range    Lactic Acid 2.4 (H) 0.7 - 2.0 mmol/L   UA with Microscopic reflex to Culture    Collection Time: 08/05/23  7:34 PM    Specimen: Urine, Clean Catch   Result Value Ref Range    Color Urine Light Yellow Colorless, Straw, Light Yellow, Yellow    Appearance Urine Clear Clear    Glucose Urine Negative Negative mg/dL    Bilirubin Urine Negative Negative    Ketones Urine Negative Negative mg/dL    Specific Gravity Urine 1.023 1.001 - 1.030    Blood Urine 1.0 mg/dL (A) Negative    pH Urine 5.5 5.0 - 7.0    Protein Albumin Urine Negative Negative mg/dL    Urobilinogen Urine <2.0 <2.0 mg/dL    Nitrite Urine Negative Negative    Leukocyte Esterase Urine 75 Robert/uL (A) Negative    Mucus Urine Present (A) None Seen /LPF    RBC Urine 2 <=2 /HPF    WBC Urine 2 <=5 /HPF    Squamous Epithelials Urine <1 <=1 /HPF   Protein  random urine    Collection Time: 08/05/23  7:34 PM   Result Value Ref Range    Total Protein Urine mg/dL 7.1 1.0 - 14.0 mg/dL    Total Protein UR MG/MG CR 0.06 mg/mg Cr    Creatinine  Urine mg/dL 120 mg/dL   Lactic acid whole blood    Collection Time: 08/05/23  9:57 PM   Result Value Ref Range    Lactic Acid 0.8 0.7 - 2.0 mmol/L         RADIOLOGY:  Reviewed all pertinent imaging. Please see official radiology report.  CT Abdomen Pelvis w Contrast   Final Result   IMPRESSION:    1.  Distention of the uterus, most likely due to the postpartum state.      Pelvis US, complete   Final Result   IMPRESSION:   1.  Hypoechoic area identified at the level of the fundal portion of the endometrium, uncertain significance. This area is not hypervascular as would be expected with retained products of conception. Potentially this may represent a blood clot. Close    clinical follow-up and potential repeat ultrasound recommended.               Chest XR,  PA & LAT   Final Result   IMPRESSION: Negative chest.          Octavia Torres PA-C  Emergency Medicine  [facility]  8/5/2023       Octavia Torres PA-C  08/05/23 3162

## 2023-08-05 NOTE — ED PROVIDER NOTES
"Emergency Department Midlevel Supervisory Note     I personally saw the patient and performed a substantive portion of the visit including all aspects of the medical decision making.    ED Course:  6:00 PM Octavia Torres PA-C staffed patient with me. I agree with their assessment and plan of management, and I will see the patient.  6:08 PM I met with the patient to introduce myself, gather additional history, perform my initial exam, and discuss the plan.     Brief HPI:     Paige Wood is a 32 year old female who presents for evaluation of a fever and cough    Yesterday evening the patient endorsed the sudden onset of a cough. Then today the patient developed a fever and chills. The patient had an at home fever as high as 104. She states that she took tylenol for her symptoms at approximately 4 PM for her symptoms. The patient also notes that she is currently one week postpartum.     I, Jany Falcon, am serving as a scribe to document services personally performed by Lennie Forrest MD, based on my observations and the provider's statements to me.   I, Lennie Forrest MD attest that Jany Falcon was acting in a scribe capacity, has observed my performance of the services and has documented them in accordance with my direction.    Brief Physical Exam: /64   Pulse 91   Temp (!) 101.9  F (38.8  C) (Oral)   Resp 18   Ht 1.676 m (5' 6\")   Wt 70.3 kg (155 lb)   SpO2 98%   Breastfeeding Unknown   BMI 25.02 kg/m    Constitutional:  Alert, in no acute distress  EYES: Conjunctivae clear  HENT:  Atraumatic, normocephalic  Respiratory:  Respirations even, unlabored, in no acute respiratory distress  Cardiovascular:  Regular rhythm, tachycardic, good peripheral perfusion  GI: Soft, nondistended, no palpable masses, no rebound, no guarding. Suprapubic tenderness to palpation.   Musculoskeletal:  No edema. No cyanosis. Range of motion major extremities intact.    Integument: Warm, Dry, No " rash. Patchy areas of erythema on left breast in the medial superior breast. Cracked bloody knuckles. Warm to touch bilaterally in breasts.   Neurologic:  Alert & oriented, no focal deficits noted  Psych: Normal mood and affect     MDM:  Body aches, cough, fever, lower abdominal pain.  The patient is 1 week postpartum.  Urinalysis is not convincing for UTI.  Chest x-ray is unremarkable.  We are awaiting pelvic ultrasound.  Given the patient's fever, tachycardia, leukocytosis, mildly elevated lactic acid, the patient was treated with IV fluids, prophylactic IV antibiotics for possible endometritis.  After return of the pelvic ultrasound, will consult OB for further recommendations.    After discussion with OB, would recommend admission for endometritis.  We discussed this with the patient, she would prefer to transfer to [place] where she can be seen by her own OB team.    Labs and Imaging:  Results for orders placed or performed during the hospital encounter of 08/05/23   Chest XR,  PA & LAT    Impression    IMPRESSION: Negative chest.   Symptomatic Influenza A/B, RSV, & SARS-CoV2 PCR (COVID-19) Nasopharyngeal    Specimen: Nasopharyngeal; Swab   Result Value Ref Range    Influenza A PCR Negative Negative    Influenza B PCR Negative Negative    RSV PCR Negative Negative    SARS CoV2 PCR Negative Negative   CBC (+ platelets, no diff)   Result Value Ref Range    WBC Count 12.4 (H) 4.0 - 11.0 10e3/uL    RBC Count 3.88 3.80 - 5.20 10e6/uL    Hemoglobin 10.6 (L) 11.7 - 15.7 g/dL    Hematocrit 32.5 (L) 35.0 - 47.0 %    MCV 84 78 - 100 fL    MCH 27.3 26.5 - 33.0 pg    MCHC 32.6 31.5 - 36.5 g/dL    RDW 14.8 10.0 - 15.0 %    Platelet Count 304 150 - 450 10e3/uL   Basic metabolic panel   Result Value Ref Range    Sodium 136 136 - 145 mmol/L    Potassium 3.5 3.5 - 5.0 mmol/L    Chloride 105 98 - 107 mmol/L    Carbon Dioxide (CO2) 18 (L) 22 - 31 mmol/L    Anion Gap 13 5 - 18 mmol/L    Urea Nitrogen 12 8 - 22 mg/dL    Creatinine  0.71 0.60 - 1.10 mg/dL    Calcium 8.7 8.5 - 10.5 mg/dL    Glucose 107 70 - 125 mg/dL    GFR Estimate >90 >60 mL/min/1.73m2   Hepatic function panel   Result Value Ref Range    Bilirubin Total 0.5 0.0 - 1.0 mg/dL    Bilirubin Direct 0.2 <=0.5 mg/dL    Protein Total 6.2 6.0 - 8.0 g/dL    Albumin 2.7 (L) 3.5 - 5.0 g/dL    Alkaline Phosphatase 114 45 - 120 U/L    AST 18 0 - 40 U/L    ALT 28 0 - 45 U/L   Lactic acid whole blood   Result Value Ref Range    Lactic Acid 2.4 (H) 0.7 - 2.0 mmol/L   UA with Microscopic reflex to Culture    Specimen: Urine, Clean Catch   Result Value Ref Range    Color Urine Light Yellow Colorless, Straw, Light Yellow, Yellow    Appearance Urine Clear Clear    Glucose Urine Negative Negative mg/dL    Bilirubin Urine Negative Negative    Ketones Urine Negative Negative mg/dL    Specific Gravity Urine 1.023 1.001 - 1.030    Blood Urine 1.0 mg/dL (A) Negative    pH Urine 5.5 5.0 - 7.0    Protein Albumin Urine Negative Negative mg/dL    Urobilinogen Urine <2.0 <2.0 mg/dL    Nitrite Urine Negative Negative    Leukocyte Esterase Urine 75 Robert/uL (A) Negative    Mucus Urine Present (A) None Seen /LPF    RBC Urine 2 <=2 /HPF    WBC Urine 2 <=5 /HPF    Squamous Epithelials Urine <1 <=1 /HPF   Protein  random urine   Result Value Ref Range    Total Protein Urine mg/dL 7.1 1.0 - 14.0 mg/dL    Total Protein UR MG/MG CR 0.06 mg/mg Cr    Creatinine Urine mg/dL 120 mg/dL     I have reviewed the relevant laboratory and radiology studies      Lennie Forrest MD  [facility] EMERGENCY ROOM  [address] [id number] [phone]       Lennie Forrest MD  08/05/23 6819     Clothing

## 2023-08-06 ENCOUNTER — HEALTH MAINTENANCE LETTER (OUTPATIENT)
Age: 33
End: 2023-08-06

## 2023-08-06 VITALS
OXYGEN SATURATION: 96 % | HEART RATE: 73 BPM | DIASTOLIC BLOOD PRESSURE: 58 MMHG | SYSTOLIC BLOOD PRESSURE: 98 MMHG | HEIGHT: 66 IN | TEMPERATURE: 98.8 F | BODY MASS INDEX: 24.91 KG/M2 | WEIGHT: 155 LBS | RESPIRATION RATE: 18 BRPM

## 2023-08-06 NOTE — ED NOTES
AIDET performed, white board updated for rounding. Patient updated on plan of care. Patient's pain assessed. Call light within reach, bed in low position, side rails up. Visitor at bedside: .

## 2023-08-06 NOTE — ED NOTES
Patient states she is feeling better, lower abdominal pain is minimal at 2/10.  Planning for transfer.

## 2023-08-06 NOTE — PHARMACY-ADMISSION MEDICATION HISTORY
Pharmacist Admission Medication History    Admission medication history is complete. The information provided in this note is only as accurate as the sources available at the time of the update.    Medication reconciliation/reorder completed by provider prior to medication history? No    Information Source(s): Patient and CareEverywhere/SureScripts via in-person    Pertinent Information: pt is breastfeeding;     Changes made to PTA medication list:  Added: tylenol, ibuprofen, albuterol, oxycodone, iron  Deleted: reglan  Changed: wellbutrin form    Medication Affordability:  Not including over the counter (OTC) medications, was there a time in the past 3 months when you did not take your medications as prescribed because of cost?: No    Allergies reviewed with patient and updates made in EHR: yes    Medication History Completed By: [facility] located within [facility] 8/5/2023 7:28 PM    Prior to Admission medications    Medication Sig Last Dose Taking? Auth Provider Long Term End Date   acetaminophen (TYLENOL) 500 MG tablet Take 500-1,000 mg by mouth every 6 hours as needed for mild pain 8/5/2023 at 1600 Yes Unknown, Entered By History     albuterol (PROAIR HFA/PROVENTIL HFA/VENTOLIN HFA) 108 (90 Base) MCG/ACT inhaler Inhale 2 puffs into the lungs every 6 hours as needed for shortness of breath, wheezing or cough More than a month at prn;  won't need Yes Unknown, Entered By History Yes    buPROPion (WELLBUTRIN XL) 150 MG 24 hr tablet Take 150 mg by mouth every morning 8/4/2023 at am Yes Unknown, Entered By History No    butalbital-acetaminophen-caffeine (ESGIC) -40 MG tablet Take 1 tablet by mouth every 4 hours as needed for headaches > 2 weeks at prn Yes Petty Marquez MD     citalopram (CELEXA) 40 MG tablet Take 40 mg by mouth daily 8/4/2023 at am Yes Reported, Patient Yes    ferrous sulfate (FEROSUL) 325 (65 Fe) MG tablet Take 325 mg by mouth daily (with breakfast) Past Month Yes Unknown, Entered By History      ibuprofen (ADVIL/MOTRIN) 200 MG tablet Take 600-800 mg by mouth every 6 hours as needed for pain 8/5/2023 at am Yes Unknown, Entered By History     methylphenidate (CONCERTA) 36 MG CR tablet Take 36 mg by mouth every morning 8/4/2023 at am Yes Reported, Patient     methylphenidate (RITALIN) 10 MG tablet Take 10 mg by mouth 2 times daily Past Week Yes Reported, Patient     ondansetron (ZOFRAN) 4 MG tablet Take 1 tablet (4 mg) by mouth every 8 hours as needed Past Month at prn Yes Petty Marquez MD     oxyCODONE (ROXICODONE) 5 MG tablet Take 5 mg by mouth every 6 hours as needed for severe pain Past Week at prn Yes Unknown, Entered By History     Prenatal Vit-Fe Fumarate-FA (PRENATAL MULTIVITAMIN W/IRON) 27-0.8 MG tablet Take 1 tablet by mouth daily Past Week Yes Reported, Patient

## 2023-08-06 NOTE — ED NOTES
Report called to CAMERON Littlejohn at [place].  They request patient be transferred closer to 0030 due to staffing.

## 2023-08-06 NOTE — PHARMACY-AMINOGLYCOSIDE DOSING SERVICE
Pharmacy Aminoglycoside Initial Note  Date of Service 2023  Patient's  1990  32 year old, female    Weight (Actual):  70.3 kg    Indication:  endometritis    Current estimated CrCl = Estimated Creatinine Clearance: 123.9 mL/min (based on SCr of 0.71 mg/dL).    Creatinine for last 3 days  2023:  6:17 PM Creatinine 0.71 mg/dL     Nephrotoxins and other renal medications (From now, onward)      Start     Dose/Rate Route Frequency Ordered Stop    23 1930  gentamicin (GARAMYCIN) 360 mg in sodium chloride 0.9 % 50 mL intermittent infusion         5 mg/kg × 70.3 kg  over 60 Minutes Intravenous EVERY 24 HOURS 23 191              Contrast Orders - past 72 hours (72h ago, onward)      None            Aminoglycoside Levels - past 2 days  No results found for requested labs within last 2 days.    Aminoglycosides IV Administrations (past 72 hours)        No aminoglycosides orders with administrations in past 72 hours.                        Plan:  1.  Start Gentamicin 360 mg (5 mg/kg) IV q24h.   2.  Target goals based on extended interval dosing  3.  Goal peak level: 17-24 mg/L  4.  Goal trough level: <0.5mg/L  5.  Pharmacy will continue to follow and check levels as appropriate in 1-3 Days      Kendy Gonzalez [facility]

## 2023-08-10 LAB
BACTERIA BLD CULT: NO GROWTH
BACTERIA BLD CULT: NO GROWTH

## 2024-02-26 ENCOUNTER — HOSPITAL ENCOUNTER (EMERGENCY)
Facility: CLINIC | Age: 34
Discharge: HOME | End: 2024-02-27
Attending: EMERGENCY MEDICINE | Admitting: EMERGENCY MEDICINE
Payer: COMMERCIAL

## 2024-02-26 DIAGNOSIS — N64.4 BREAST PAIN: ICD-10-CM

## 2024-02-26 DIAGNOSIS — N61.0 MASTITIS: ICD-10-CM

## 2024-02-26 LAB
ALBUMIN UR-MCNC: 10 MG/DL
ANION GAP SERPL CALCULATED.3IONS-SCNC: 10 MMOL/L (ref 7–15)
APPEARANCE UR: CLEAR
BASOPHILS # BLD AUTO: 0 10E3/UL (ref 0–0.2)
BASOPHILS NFR BLD AUTO: 0 %
BILIRUB UR QL STRIP: NEGATIVE
BUN SERPL-MCNC: 23.4 MG/DL (ref 6–20)
CALCIUM SERPL-MCNC: 9.1 MG/DL (ref 8.6–10)
CHLORIDE SERPL-SCNC: 103 MMOL/L (ref 98–107)
COLOR UR AUTO: YELLOW
CREAT SERPL-MCNC: 0.86 MG/DL (ref 0.51–0.95)
CRP SERPL-MCNC: <3 MG/L
DEPRECATED HCO3 PLAS-SCNC: 27 MMOL/L (ref 22–29)
EGFRCR SERPLBLD CKD-EPI 2021: 90 ML/MIN/1.73M2
EOSINOPHIL # BLD AUTO: 0.1 10E3/UL (ref 0–0.7)
EOSINOPHIL NFR BLD AUTO: 2 %
ERYTHROCYTE [DISTWIDTH] IN BLOOD BY AUTOMATED COUNT: 12.6 % (ref 10–15)
FLUAV RNA SPEC QL NAA+PROBE: NEGATIVE
FLUBV RNA RESP QL NAA+PROBE: NEGATIVE
GLUCOSE SERPL-MCNC: 80 MG/DL (ref 70–99)
GLUCOSE UR STRIP-MCNC: NEGATIVE MG/DL
HCT VFR BLD AUTO: 36 % (ref 35–47)
HGB BLD-MCNC: 11.6 G/DL (ref 11.7–15.7)
HGB UR QL STRIP: NEGATIVE
IMM GRANULOCYTES # BLD: 0 10E3/UL
IMM GRANULOCYTES NFR BLD: 0 %
KETONES UR STRIP-MCNC: NEGATIVE MG/DL
LACTATE SERPL-SCNC: 0.4 MMOL/L (ref 0.7–2)
LEUKOCYTE ESTERASE UR QL STRIP: NEGATIVE
LYMPHOCYTES # BLD AUTO: 2.8 10E3/UL (ref 0.8–5.3)
LYMPHOCYTES NFR BLD AUTO: 44 %
MCH RBC QN AUTO: 28.2 PG (ref 26.5–33)
MCHC RBC AUTO-ENTMCNC: 32.2 G/DL (ref 31.5–36.5)
MCV RBC AUTO: 88 FL (ref 78–100)
MONOCYTES # BLD AUTO: 0.4 10E3/UL (ref 0–1.3)
MONOCYTES NFR BLD AUTO: 7 %
MUCOUS THREADS #/AREA URNS LPF: PRESENT /LPF
NEUTROPHILS # BLD AUTO: 3 10E3/UL (ref 1.6–8.3)
NEUTROPHILS NFR BLD AUTO: 47 %
NITRATE UR QL: NEGATIVE
NRBC # BLD AUTO: 0 10E3/UL
NRBC BLD AUTO-RTO: 0 /100
PH UR STRIP: 6.5 [PH] (ref 5–7)
PLATELET # BLD AUTO: 276 10E3/UL (ref 150–450)
POTASSIUM SERPL-SCNC: 3.7 MMOL/L (ref 3.4–5.3)
RBC # BLD AUTO: 4.11 10E6/UL (ref 3.8–5.2)
RBC URINE: 1 /HPF
RSV RNA SPEC NAA+PROBE: NEGATIVE
SARS-COV-2 RNA RESP QL NAA+PROBE: NEGATIVE
SODIUM SERPL-SCNC: 140 MMOL/L (ref 135–145)
SP GR UR STRIP: 1.03 (ref 1–1.03)
SQUAMOUS EPITHELIAL: 2 /HPF
UROBILINOGEN UR STRIP-MCNC: <2 MG/DL
WBC # BLD AUTO: 6.3 10E3/UL (ref 4–11)
WBC URINE: 4 /HPF

## 2024-02-26 PROCEDURE — 250N000013 HC RX MED GY IP 250 OP 250 PS 637: Performed by: PHYSICIAN ASSISTANT

## 2024-02-26 PROCEDURE — 87637 SARSCOV2&INF A&B&RSV AMP PRB: CPT | Performed by: EMERGENCY MEDICINE

## 2024-02-26 PROCEDURE — 81001 URINALYSIS AUTO W/SCOPE: CPT | Performed by: PHYSICIAN ASSISTANT

## 2024-02-26 PROCEDURE — 250N000011 HC RX IP 250 OP 636: Performed by: PHYSICIAN ASSISTANT

## 2024-02-26 PROCEDURE — 96374 THER/PROPH/DIAG INJ IV PUSH: CPT

## 2024-02-26 PROCEDURE — 36415 COLL VENOUS BLD VENIPUNCTURE: CPT | Performed by: PHYSICIAN ASSISTANT

## 2024-02-26 PROCEDURE — 83605 ASSAY OF LACTIC ACID: CPT | Performed by: PHYSICIAN ASSISTANT

## 2024-02-26 PROCEDURE — 80048 BASIC METABOLIC PNL TOTAL CA: CPT | Performed by: PHYSICIAN ASSISTANT

## 2024-02-26 PROCEDURE — 86140 C-REACTIVE PROTEIN: CPT | Performed by: PHYSICIAN ASSISTANT

## 2024-02-26 PROCEDURE — 99284 EMERGENCY DEPT VISIT MOD MDM: CPT | Mod: 25

## 2024-02-26 PROCEDURE — 85025 COMPLETE CBC W/AUTO DIFF WBC: CPT | Performed by: PHYSICIAN ASSISTANT

## 2024-02-26 RX ORDER — OXYCODONE HYDROCHLORIDE 5 MG/1
5 TABLET ORAL ONCE
Status: COMPLETED | OUTPATIENT
Start: 2024-02-27 | End: 2024-02-26

## 2024-02-26 RX ORDER — VANCOMYCIN HYDROCHLORIDE 1 G/200ML
1000 INJECTION, SOLUTION INTRAVENOUS ONCE
Status: COMPLETED | OUTPATIENT
Start: 2024-02-27 | End: 2024-02-27

## 2024-02-26 RX ADMIN — OXYCODONE 5 MG: 5 TABLET ORAL at 23:57

## 2024-02-26 RX ADMIN — VANCOMYCIN HYDROCHLORIDE 1000 MG: 1 INJECTION, SOLUTION INTRAVENOUS at 23:51

## 2024-02-26 ASSESSMENT — ACTIVITIES OF DAILY LIVING (ADL)
ADLS_ACUITY_SCORE: 33
ADLS_ACUITY_SCORE: 35

## 2024-02-26 ASSESSMENT — COLUMBIA-SUICIDE SEVERITY RATING SCALE - C-SSRS
1. IN THE PAST MONTH, HAVE YOU WISHED YOU WERE DEAD OR WISHED YOU COULD GO TO SLEEP AND NOT WAKE UP?: NO
2. HAVE YOU ACTUALLY HAD ANY THOUGHTS OF KILLING YOURSELF IN THE PAST MONTH?: NO
6. HAVE YOU EVER DONE ANYTHING, STARTED TO DO ANYTHING, OR PREPARED TO DO ANYTHING TO END YOUR LIFE?: NO

## 2024-02-27 VITALS
SYSTOLIC BLOOD PRESSURE: 108 MMHG | DIASTOLIC BLOOD PRESSURE: 78 MMHG | RESPIRATION RATE: 16 BRPM | WEIGHT: 150 LBS | TEMPERATURE: 98.5 F | OXYGEN SATURATION: 98 % | HEIGHT: 65 IN | HEART RATE: 58 BPM | BODY MASS INDEX: 24.99 KG/M2

## 2024-02-27 PROCEDURE — 250N000011 HC RX IP 250 OP 636: Performed by: PHYSICIAN ASSISTANT

## 2024-02-27 PROCEDURE — 96375 TX/PRO/DX INJ NEW DRUG ADDON: CPT

## 2024-02-27 RX ORDER — DIPHENHYDRAMINE HYDROCHLORIDE 50 MG/ML
50 INJECTION INTRAMUSCULAR; INTRAVENOUS ONCE
Status: COMPLETED | OUTPATIENT
Start: 2024-02-27 | End: 2024-02-27

## 2024-02-27 RX ADMIN — DIPHENHYDRAMINE HYDROCHLORIDE 50 MG: 50 INJECTION INTRAMUSCULAR; INTRAVENOUS at 00:51

## 2024-02-27 ASSESSMENT — ACTIVITIES OF DAILY LIVING (ADL): ADLS_ACUITY_SCORE: 35

## 2024-02-27 NOTE — PHARMACY-VANCOMYCIN DOSING SERVICE
Pharmacy Vancomycin Initial Note  Date of Service 2024  Patient's  1990  33 year old, female    Indication: Skin and Soft Tissue Infection, mastitis    Current estimated CrCl = Estimated Creatinine Clearance: 98 mL/min (based on SCr of 0.86 mg/dL).    Creatinine for last 3 days  2024: 10:40 PM Creatinine 0.86 mg/dL    Recent Vancomycin Level(s) for last 3 days  No results found for requested labs within last 3 days.      Vancomycin IV Administrations (past 72 hours)                     vancomycin (VANCOCIN) 1,000 mg in NaCl 0.9% 200 mL intermittent infusion (mg) 1,000 mg Given 24 3729                    Nephrotoxins and other renal medications (From now, onward)      None            Contrast Orders - past 72 hours (72h ago, onward)      None            Insight[employer] Prediction of Planned Initial Vancomycin Regimen        Plan:  Start vancomycin  1000 mg IV x1.   Contact pharmacy if vancomycin to be continued inpatient.      Keya Taylor, [facility]

## 2024-02-27 NOTE — ED PROVIDER NOTES
"EMERGENCY DEPARTMENT MIDLEVEL SUPERVISORY NOTE    Date/Time: 2/26/2024 11:14 PM    I am seeing this patient along with Josey Fontanez PA-C.  I have seen and evaluated the patient independently at bedside and agree with the JEY's history, assessment and plan.  I personally saw the patient and performed a substantive portion of the visit including all aspects of the medical decision making.      BRIEF HPI    Paige Wood is a 33 year old female presenting with fever and diaphoresis today in the setting of recent diagnosis of bilateral mastitis (diagnosed on 1/22/24) and treatment with dicloxacillin and pain control with oral Toradol.    BRIEF PHYSICAL EXAM  Vitals: /84   Pulse 58   Temp 98.5  F (36.9  C) (Oral)   Resp 16   Ht 1.651 m (5' 5\")   Wt 68 kg (150 lb)   LMP  (LMP Unknown)   SpO2 96%   Breastfeeding Yes   BMI 24.96 kg/m    General: Appears in no acute distress, awake, alert, interactive.  HENT: Atraumatic. MMM.   Neck: Full AROM.  Cardiovascular: Regular rate and rhythm.  Respiratory/Chest: Normal work of breathing. Speaking in full sentences. No erythema, open wounds, or active drainage to breasts.   Abdomen: Non-distended.  Musculoskeletal: Normal appearing extremities without obvious deformities or signs of trauma. No edema or erythema.  Skin: Normal color. No visible rash or diaphoresis.  Neurologic: Alert. Face symmetric, moves all extremities spontaneously. Speech clear.  Psychiatric: Affect appropriate, cooperative.    ED COURSE  11:11 PM I received the patient report from the JEY, Josey Fontanez PA-C. I agree with their assessment and plan of management, and I will see the patient.  11:30 PM I met with the patient to introduce myself, gather additional history, perform my initial exam, and discuss the plan.   12:49 AM Spoke with Dr. Hull, OB/Gyn. Discussed the patient's case and recommendations.  12:57 AM I rechecked the patient and updated them on lab results and OB/Gyn " "recommendations.    MEDICAL DECISION MAKING    Pertinent Labs and Imaging reviewed (see chart for details)    Paige Wood is a 33 year old year old who presents for evaluation of bilateral breast pain. Vitals on arrival stable. History and physical exam, as above.     Patient is ~7 months post partum and has been breastfeeding since delivery. Unfortunately, she has also been dealing with ongoing bilateral mastitis and breast pain for which she has been on multiple courses of antibiotics. She was seen at the breast center on 2/1/24 and had an ultrasound and mammogram. She is currently on day 5 of 10 of dicloxacillin but reports that it seems as though her symptoms have \"plateaued.\" She decided to come in today because she developed a low grade temp of 99F despite taking toradol.  She is concerned because in the past, the infection has become more severe and she wanted to ensure it doesn't spread.     Patient was initially evaluated by PA who placed orders for labs.  These were reassuring. CBC reassuring. No evidence of leukocytosis to suggest systemic infectious/inflammatory process. No acute anemia. PLTs wnl. Lactate within normal limits, less likely end-organ ischemia or systemic infectious process. CMP reassuring. No evidence of VANESSA, acidosis, or significant electrolyte derangement. No acute elevation of bilirubin or transaminates to suggest acute hepatobiliary process. UA without evidence of infection. No hematuria to suggest nephrolithiasis/ureterolithiasis.     I met with the patient and reviewed results of labs and discussed options for ongoing workup and management.  Out of abundance of caution, will plan to give a dose of IV antibiotics here and discussed case with OB/GYN to ensure that they do not have any additional recommendations.  At this point, I do believe that patient may benefit from repeat imaging but as we would not be able to perform aspiration/drainage of any abscesses here, she may benefit " more from evaluation at the breast center where technicians are skilled in performing these types of scans.    Patient was given a dose of vancomycin and did develop some itching toward the end of the infusion.  She was given Benadryl with resolution.  Will add this to her potential allergy list.  I discussed the case with Dr. Hull who agreed with workup and management here.  She also agreed with plan to hold on obtaining repeat imaging here this evening and instead, have patient follow-up closely at the breast center.  She did not recommend making any changes to antibiotic regimen at this time.    I updated patient after this discussion and with recommendations for close follow-up with breast clinic.  She was very comfortable with plan and will call them tomorrow morning.    At conclusion of encounter I discussed results of all of tests and recommendation for disposition. All questions were answered. Patient acknowledged understanding and was agreeable with care plan.     Please see midlevel note for additional details. I personally saw the patient and performed a substantive portion of the visit including all aspects of the medical decision making.     FINAL IMPRESSION    Mastitis        Octavia Madden MD  Emergency Medicine  2/26/2024 11:14 PM     Octavia Madden MD  02/27/24 0223

## 2024-02-27 NOTE — ED TRIAGE NOTES
Patient arrives to the ER with c/o low grade fevers. Patient currently on PO Toradol and antibiotics for double mastitis. Per patient, mastitis has been ongoing for about 30 days and at one point was septic.  Afebrile in triage, last had toradol at 1730.     Triage Assessment (Adult)       Row Name 02/26/24 1920          Triage Assessment    Airway WDL WDL        Respiratory WDL    Respiratory WDL WDL        Skin Circulation/Temperature WDL    Skin Circulation/Temperature WDL X  c/o feeling clammy, chills, sweats        Cardiac WDL    Cardiac WDL WDL        Peripheral/Neurovascular WDL    Peripheral Neurovascular WDL WDL        Cognitive/Neuro/Behavioral WDL    Cognitive/Neuro/Behavioral WDL WDL

## 2024-02-27 NOTE — DISCHARGE INSTRUCTIONS
You were seen in the Emergency Department today for breast pain and possible infection.     You should continue to take your antibiotics as prescribed and call the breast clinic tomorrow to schedule follow-up.  Also continue using your Toradol.      Please return to the ER if you experience fever that does not resolve with medications, inability to keep fluids down, uncontrolled pain or swelling, increasing redness, and/or for any other new or concerning symptoms, otherwise please follow up with the breast center for recheck.     Below is some information you might find useful.     Thank you for choosing [facility]. It was a pleasure taking care of you today!  - Dr. Octavia Madden

## 2024-02-27 NOTE — ED PROVIDER NOTES
Emergency Department Encounter   NAME: Paige Wood ; AGE: 33 year old female ; YOB: 1990 ; MRN: [id number] ; PCP: Suze Montes   ED PROVIDER: Josey Fontanez PA-C    Evaluation Date & Time:   2/26/2024  9:52 PM    CHIEF COMPLAINT:  Fever      Impression and Plan   MDM:   Paige Wood is a 33 year old female with a pertinent history of endometritis, mastitis, NATALI.  who presents to the ED by walk in for evaluation of fever.  The patient presents to the emergency department for evaluation of ongoing mastitis with concerns for a low-grade fever with a temperature of 99 after taking Toradol this evening.  Here in the ER, she is currently afebrile vitally stable.  She is well-appearing, nontoxic, and very pleasant.  Sitting comfortably in the bed, actively pumping.  She has been following closely with her OB, and is currently on her third round of antibiotics within the last month and a half.  She did have an unremarkable breast ultrasound and mammogram on 2/21 which was reviewed.  Currently on day 5 of a 10-day course of dicloxacillin.  She has not had any increased breast pain or certain areas of concern on the breast tissue.  Her breast exam outside of palpable clogged milk ducts on the right is unremarkable.  There is no erythema, warmth, induration, fluctuance or focal areas of tenderness to suggest obvious abscess or cellulitis.  Laboratory workup initiated which overall returned reassuring.  Viral swab negative.  UA without signs of infection and no hematuria to suggest nephrolithiasis.  No leukocytosis, elevated lactate, measurable fever or abnormal vitals here in the emergency department to suggest sepsis.  No concerning metabolic derangements.  Renal function normal.  CRP within normal limits.  No abdominal pain, and abdominal exam is benign.  No concern for intra-abdominal source.  Given her ongoing symptoms, she may need outpatient breast imaging to rule out an occult abscess  given continued symptoms and low-grade fevers, however as she is clinically well-appearing, vitally stable, not showing any evidence of sepsis, do feel that this may be best performed at the breast clinic given their expertise with breast imaging. Patient received a dose of IV vancomycin here in the emergency department, and did develop pruritus during infusion which was treated with a dose of Benadryl.  At time of signout, plan to discuss with OB/Gyn to determine need for breast imaging tonight and or any changes to antibiotic regimen.  This is signed out to my colleague, Dr. Madden, at the end of my shift.  I suspect likely discharged home with close follow-up with the breast center.    Medical Decision Making    History:  Supplemental history from: Family Member/Significant Other  External Record(s) reviewed: Inpatient Record: ED visit on 1/24/2024, OB/Gyn visit on 2/15/2024, and breast US and mammogram on 2/1/2024     Work Up:  Chart documentation includes differential considered and any EKGs or imaging independently interpreted by provider, where specified.  In additional to work up documented, I considered the following work up: Documented in chart, if applicable.    External consultation:  Discussion of management with another provider: OB-Gyn    Complicating factors:  Care impacted by chronic illness: Other: breastfeeding/mastitis   Care affected by social determinants of health: Access to Medical Care    Disposition considerations: Discharge. I recommended the patient continue their current prescription strength medication(s): Dicloxacillin. See documentation for any additional details.      ED COURSE:  10:15 PM I met and introduced myself to the patient. I gathered initial history and performed my physical exam. We discussed plan for initial workup.   11:11 PM I have staffed the patient with Dr. Madden, ED MD, who has evaluated the patient and agrees with all aspects of today's care.   12:30 AM Patient signed  out to Dr. Madden at the end of my shift pending discussion with OB/Gyn with plan for likely discharge.     At the conclusion of the encounter I discussed the results of all the tests and the disposition. The questions were answered. The patient or family acknowledged understanding and was agreeable with the care plan.    FINAL IMPRESSION:    ICD-10-CM    1. Mastitis  N61.0       2. Breast pain  N64.4             MEDICATIONS GIVEN IN THE EMERGENCY DEPARTMENT:  Medications   vancomycin (VANCOCIN) 1,000 mg in NaCl 0.9% 200 mL intermittent infusion (1,000 mg Intravenous $Given 2/26/24 0221)   oxyCODONE (ROXICODONE) tablet 5 mg (5 mg Oral $Given 2/26/24 2407)   diphenhydrAMINE (BENADRYL) injection 50 mg (50 mg Intravenous $Given 2/27/24 0051)         NEW PRESCRIPTIONS STARTED AT TODAY'S ED VISIT:  Discharge Medication List as of 2/27/2024  1:13 AM            HPI   Patient information was obtained from: Patient    Use of Intrepreter: N/A     Paige Wood is a 33 year old female with a pertinent history of endometritis, mastitis, NATALI.  who presents to the ED by walk in for evaluation of fever.     Patient gave birth on 7/29/23 and was admitted 8/6/2023 for fever, diagnosed with endometritis and mastitis. Was treated with IV unasyn x 24 hours with improvement in symptoms and discharged on dicloxacillin. She was evaluated again and was started on dicloxacillin for bilateral mastitis on 1/22/2024. Patient reports that both of her breasts are tender and she has lumps on her breasts. Today, patient was feeling feverish and diaphoretic and took her temp which was 99.6, but notes that she is on PO Toradol.  She also endorses yellow nasal discharge, dysuria, and bladder pain. She denies cough and myalgias.     REVIEW OF SYSTEMS:  Pertinent positive and negative symptoms per HPI.       Medical History     Past Medical History:   Diagnosis Date    ADHD (attention deficit hyperactivity disorder)     Anxiety        No past surgical  "history on file.    No family history on file.    Social History     Tobacco Use    Smoking status: Never    Smokeless tobacco: Never       acetaminophen (TYLENOL) 500 MG tablet  albuterol (PROAIR HFA/PROVENTIL HFA/VENTOLIN HFA) 108 (90 Base) MCG/ACT inhaler  buPROPion (WELLBUTRIN XL) 150 MG 24 hr tablet  butalbital-acetaminophen-caffeine (ESGIC) -40 MG tablet  citalopram (CELEXA) 40 MG tablet  ferrous sulfate (FEROSUL) 325 (65 Fe) MG tablet  ibuprofen (ADVIL/MOTRIN) 200 MG tablet  methylphenidate (CONCERTA) 36 MG CR tablet  methylphenidate (RITALIN) 10 MG tablet  ondansetron (ZOFRAN) 4 MG tablet  oxyCODONE (ROXICODONE) 5 MG tablet  Prenatal Vit-Fe Fumarate-FA (PRENATAL MULTIVITAMIN W/IRON) 27-0.8 MG tablet          Physical Exam     First Vitals:  Patient Vitals for the past 24 hrs:   BP Temp Temp src Pulse Resp SpO2 Height Weight   02/27/24 0030 108/78 -- -- 58 -- 98 % -- --   02/27/24 0000 119/57 -- -- 54 -- 97 % -- --   02/26/24 2200 127/84 98.5  F (36.9  C) Oral 58 16 96 % -- --   02/26/24 1918 134/87 98.4  F (36.9  C) Oral 74 20 96 % 1.651 m (5' 5\") 68 kg (150 lb)         PHYSICAL EXAM:   Physical Exam  Vitals and nursing note reviewed.   Constitutional:       General: She is not in acute distress.     Appearance: She is not ill-appearing, toxic-appearing or diaphoretic.   Eyes:      Conjunctiva/sclera: Conjunctivae normal.   Cardiovascular:      Rate and Rhythm: Normal rate and regular rhythm.      Heart sounds: Normal heart sounds.   Pulmonary:      Effort: Pulmonary effort is normal.      Breath sounds: Normal breath sounds.      Comments: Clogged milk ducts to right breast at 8 o'clock, though no focal area of tenderness, fluctuance or induration. No erythema or wedge shaped redness. No active nipple drainage.   Abdominal:      General: Abdomen is flat. There is no distension.      Palpations: Abdomen is soft.      Tenderness: There is no abdominal tenderness. There is no right CVA tenderness, left " CVA tenderness, guarding or rebound.   Skin:     General: Skin is warm and dry.   Neurological:      Mental Status: She is alert.             Results     LAB:  All pertinent labs reviewed and interpreted  Labs Ordered and Resulted from Time of ED Arrival to Time of ED Departure   BASIC METABOLIC PANEL - Abnormal       Result Value    Sodium 140      Potassium 3.7      Chloride 103      Carbon Dioxide (CO2) 27      Anion Gap 10      Urea Nitrogen 23.4 (*)     Creatinine 0.86      GFR Estimate 90      Calcium 9.1      Glucose 80     LACTIC ACID WHOLE BLOOD - Abnormal    Lactic Acid 0.4 (*)    ROUTINE UA WITH MICROSCOPIC REFLEX TO CULTURE - Abnormal    Color Urine Yellow      Appearance Urine Clear      Glucose Urine Negative      Bilirubin Urine Negative      Ketones Urine Negative      Specific Gravity Urine 1.031 (*)     Blood Urine Negative      pH Urine 6.5      Protein Albumin Urine 10 (*)     Urobilinogen Urine <2.0      Nitrite Urine Negative      Leukocyte Esterase Urine Negative      Mucus Urine Present (*)     RBC Urine 1      WBC Urine 4      Squamous Epithelials Urine 2 (*)    CBC WITH PLATELETS AND DIFFERENTIAL - Abnormal    WBC Count 6.3      RBC Count 4.11      Hemoglobin 11.6 (*)     Hematocrit 36.0      MCV 88      MCH 28.2      MCHC 32.2      RDW 12.6      Platelet Count 276      % Neutrophils 47      % Lymphocytes 44      % Monocytes 7      % Eosinophils 2      % Basophils 0      % Immature Granulocytes 0      NRBCs per 100 WBC 0      Absolute Neutrophils 3.0      Absolute Lymphocytes 2.8      Absolute Monocytes 0.4      Absolute Eosinophils 0.1      Absolute Basophils 0.0      Absolute Immature Granulocytes 0.0      Absolute NRBCs 0.0     INFLUENZA A/B, RSV, & SARS-COV2 PCR - Normal    Influenza A PCR Negative      Influenza B PCR Negative      RSV PCR Negative      SARS CoV2 PCR Negative     CRP INFLAMMATION - Normal    CRP Inflammation <3.00         RADIOLOGY:  No orders to display         I,  Eb Mark, am serving as a scribe to document services personally performed by Josey Fontanez PA-C, based on my observation and the provider's statements to me. I, Josey Fontanez PA-C attest that Eb Mark is acting in a scribe capacity, has observed my performance of the services and has documented them in accordance with my direction.       Josey Fontanez PA-C   Emergency Medicine   [facility] EMERGENCY ROOM       Josey Fontanez PA-C  02/27/24 0433

## 2024-09-28 ENCOUNTER — HEALTH MAINTENANCE LETTER (OUTPATIENT)
Age: 34
End: 2024-09-28